# Patient Record
Sex: FEMALE | Race: WHITE | NOT HISPANIC OR LATINO | URBAN - NONMETROPOLITAN AREA
[De-identification: names, ages, dates, MRNs, and addresses within clinical notes are randomized per-mention and may not be internally consistent; named-entity substitution may affect disease eponyms.]

---

## 2019-08-12 ENCOUNTER — IMPORTED ENCOUNTER (OUTPATIENT)
Dept: URBAN - NONMETROPOLITAN AREA CLINIC 1 | Facility: CLINIC | Age: 84
End: 2019-08-12

## 2019-08-12 PROBLEM — H35.3131: Noted: 2019-08-12

## 2019-08-12 PROBLEM — H33.321: Noted: 2019-08-12

## 2019-08-12 PROBLEM — H25.011: Noted: 2019-08-12

## 2019-08-12 PROBLEM — D31.31: Noted: 2019-08-12

## 2019-08-12 PROBLEM — Z96.1: Noted: 2019-08-12

## 2019-08-12 PROCEDURE — 99214 OFFICE O/P EST MOD 30 MIN: CPT

## 2019-08-12 NOTE — PATIENT DISCUSSION
Non-Exudative ARMD OU- Recommend nutritional supplementation with high dose multivitamins and zinc (according to the AREDS II protocol) with continued yearly follow-up evaluation. Since the patient is at an increased risk for the development of Choroidal Neovascularization I gave the patient an [address] instructing them how to use asking the patient to return immediately should the patient notice any change in the [address] pattern or subjective change in visual acuity.- No clinical findings including sub retinal hemorrhages or exudates to suggest choroidal neovascularization.- No treatment at this time. Observation recommended. Cataract OD-Visually significant.-Cataract causing symptomatic impairment of visual function not correctable with a tolerable change in glasses or contact lenses lighting or non-operative means resulting in specific activity limitations and/or participation restrictions including but not limited to reading viewing television driving or meeting vocational or recreational needs. -Expectation is clearer vision and reduced glare disability after cataract removal.-Refer to Dr Carmen Mtz for cataract evaluations/p PCIOL OS-Stable PCIOL OS.-Monitor for PCO OS. Choroidal Nevus OD- 1 DD Choroidal Nevus no change from previous exam.Hx of retinal hole OD s/p laser tx OD (40 years ago)- No treatment indicated.

## 2019-09-11 ENCOUNTER — IMPORTED ENCOUNTER (OUTPATIENT)
Dept: URBAN - NONMETROPOLITAN AREA CLINIC 1 | Facility: CLINIC | Age: 84
End: 2019-09-11

## 2019-09-11 PROBLEM — H25.011: Noted: 2019-09-11

## 2019-09-11 PROBLEM — D31.31: Noted: 2019-09-11

## 2019-09-11 PROBLEM — Z96.1: Noted: 2019-09-11

## 2019-09-11 PROBLEM — H11.32: Noted: 2019-09-11

## 2019-09-11 PROBLEM — H35.3131: Noted: 2019-09-11

## 2019-09-11 PROBLEM — H33.321: Noted: 2019-09-11

## 2019-09-11 PROCEDURE — 99212 OFFICE O/P EST SF 10 MIN: CPT

## 2019-09-11 NOTE — PATIENT DISCUSSION
Subconjunctival Heme OS- Discussed with patient that [facility] will resolve on it's own and she will just have to give it a little time.- Recommend follow-up PRN*******************************Previous Exam****************************************Non-Exudative ARMD OU- Recommend nutritional supplementation with high dose multivitamins and zinc (according to the AREDS II protocol) with continued yearly follow-up evaluation. Since the patient is at an increased risk for the development of Choroidal Neovascularization I gave the patient an [address] instructing them how to use asking the patient to return immediately should the patient notice any change in the [address] pattern or subjective change in visual acuity.- No clinical findings including sub retinal hemorrhages or exudates to suggest choroidal neovascularization.- No treatment at this time. Observation recommended. Cataract OD-Not yet surgical. -Reviewed symptoms of advancing cataract growth such as glare and halos and decreased vision.-Continue to monitor for now. Pt will notify us if any new symptoms develop. s/p PCIOL OS-Stable PCIOL OS.-Monitor for PCO OS. Choroidal Nevus OD- 1 DD Choroidal Nevus no change from previous exam.Hx of retinal hole OD s/p laser tx OD (40 years ago)- No treatment indicated.

## 2020-09-09 ENCOUNTER — IMPORTED ENCOUNTER (OUTPATIENT)
Dept: URBAN - NONMETROPOLITAN AREA CLINIC 1 | Facility: CLINIC | Age: 85
End: 2020-09-09

## 2020-09-09 PROBLEM — Z96.1: Noted: 2020-09-09

## 2020-09-09 PROBLEM — H35.3131: Noted: 2020-09-09

## 2020-09-09 PROBLEM — H52.4: Noted: 2021-09-16

## 2020-09-09 PROBLEM — H26.492: Noted: 2021-09-16

## 2020-09-09 PROBLEM — D31.31: Noted: 2021-09-16

## 2020-09-09 PROBLEM — H25.011: Noted: 2020-09-09

## 2020-09-09 PROBLEM — H25.11: Noted: 2021-09-16

## 2020-09-09 PROBLEM — D31.31: Noted: 2020-09-09

## 2020-09-09 PROBLEM — H33.321: Noted: 2021-09-16

## 2020-09-09 PROBLEM — H33.321: Noted: 2020-09-09

## 2020-09-09 PROCEDURE — 92134 CPTRZ OPH DX IMG PST SGM RTA: CPT

## 2020-09-09 PROCEDURE — 99214 OFFICE O/P EST MOD 30 MIN: CPT

## 2020-09-09 NOTE — PATIENT DISCUSSION
Non-Exudative ARMD OU- Recommend nutritional supplementation with high dose multivitamins and zinc (according to the AREDS II protocol) with continued yearly follow-up evaluation. Since the patient is at an increased risk for the development of Choroidal Neovascularization I gave the patient an [id number] instructing them how to use asking the patient to return immediately should the patient notice any change in the [address] pattern or subjective change in visual acuity.- No clinical findings including sub retinal hemorrhages or exudates to suggest choroidal neovascularization.- No treatment at this time. Observation recommended. Cataract OD-Not yet surgical. -Reviewed symptoms of advancing cataract growth such as glare and halos and decreased vision.-Continue to monitor for now. Pt will notify us if any new symptoms develop. s/p PCIOL OS-Stable PCIOL OS.-Monitor for PCO OS. Choroidal Nevus OD- 1 DD Choroidal Nevus no change from previous exam.Hx of retinal hole OD s/p laser tx OD (40 years ago)- No treatment indicated. all

## 2021-09-16 ENCOUNTER — PREPPED CHART (OUTPATIENT)
Dept: RURAL CLINIC 3 | Facility: CLINIC | Age: 86
End: 2021-09-16

## 2021-09-16 ENCOUNTER — IMPORTED ENCOUNTER (OUTPATIENT)
Dept: URBAN - NONMETROPOLITAN AREA CLINIC 1 | Facility: CLINIC | Age: 86
End: 2021-09-16

## 2021-09-16 PROCEDURE — 99214 OFFICE O/P EST MOD 30 MIN: CPT

## 2021-09-16 PROCEDURE — 92250 FUNDUS PHOTOGRAPHY W/I&R: CPT

## 2021-09-16 PROCEDURE — 92015 DETERMINE REFRACTIVE STATE: CPT

## 2021-09-16 NOTE — PATIENT DISCUSSION
ARMD OUDiscussed the chronic nature of this disease and limited treatment options. Photos done today; baseline with drusen OU. Recommend taking eye vitamins daily and monitoring vision for changes with [employer] samples of Preservision given today. Continue to monitor. RTC in 6 months with OCT Denise ODDiscussed diagnosis with patient. Reviewed symptoms related to cataract progression. Discussed various treatment options with patient. Recommend cataract evaluation by Dr. Kindra Sanchez. Patient defers treatment at this time. Continue to monitor. P/C IOL OSDiscussed diagnosis in detail with patient. Intraocular implant in place and stable. Moderate PCO noted OS. No complaints from patient. Continue to monitor. Choroidal Nevus ODDiscussed diagnosis in detail with patient. 1 DD Choroidal Nevus no change from previous exam.Continue to monitor. Hx of retinal hole OD s/p laser tx OD (40 years ago)Discussed diagnosis in detail with patient. No treatment indicated. Continue to monitor. Presbyopia OUDiscussed refractive status in detail with patient. New glasses Rx given today. Continue to monitor.; 's Notes: MR  9/16/21DFE  9/16/21PHOTOS  9/16/21OCT

## 2022-03-15 ASSESSMENT — VISUAL ACUITY
OS_SC: 20/20-
OD_PH: 20/50
OD_SC: 20/400

## 2022-03-15 ASSESSMENT — TONOMETRY
OS_IOP_MMHG: 13
OD_IOP_MMHG: 14

## 2022-04-09 ASSESSMENT — VISUAL ACUITY
OD_CC: 20/400
OS_CC: 20/25-2
OD_PH: 20/50
OD_PH: 20/50-1
OD_PH: 20/50+2
OD_CC: 20/400
OD_PH: 20/50-2
OD_CC: 20/300
OD_CC: 20/400
OS_CC: 20/20-1
OS_CC: 20/20-2
OS_CC: 20/25-2

## 2022-04-09 ASSESSMENT — TONOMETRY
OS_IOP_MMHG: 15
OD_IOP_MMHG: 14
OD_IOP_MMHG: 15
OS_IOP_MMHG: 13
OS_IOP_MMHG: 14
OD_IOP_MMHG: 14

## 2023-01-05 ENCOUNTER — FOLLOW UP (OUTPATIENT)
Dept: RURAL CLINIC 3 | Facility: CLINIC | Age: 88
End: 2023-01-05

## 2023-01-05 DIAGNOSIS — H35.3131: ICD-10-CM

## 2023-01-05 DIAGNOSIS — H52.4: ICD-10-CM

## 2023-01-05 DIAGNOSIS — D31.31: ICD-10-CM

## 2023-01-05 DIAGNOSIS — H25.11: ICD-10-CM

## 2023-01-05 PROCEDURE — 92134 CPTRZ OPH DX IMG PST SGM RTA: CPT

## 2023-01-05 PROCEDURE — 92015 DETERMINE REFRACTIVE STATE: CPT

## 2023-01-05 PROCEDURE — 99214 OFFICE O/P EST MOD 30 MIN: CPT

## 2023-01-05 ASSESSMENT — VISUAL ACUITY
OS_SC: 20/20-2
OD_PH: 20/150
OD_SC: 20/200-1

## 2023-01-05 ASSESSMENT — TONOMETRY
OD_IOP_MMHG: 16
OS_IOP_MMHG: 16

## 2023-01-05 NOTE — PATIENT DISCUSSION
Discussed diagnosis in detail with patient. Reviewed symptoms related to cataract progression. Recommend refer to Dr. Brent Herrera for cataract evaluation. Patient elects to schedule.

## 2023-04-26 ENCOUNTER — PRE-OP/H&P (OUTPATIENT)
Dept: RURAL CLINIC 3 | Facility: CLINIC | Age: 88
End: 2023-04-26

## 2023-04-26 VITALS
HEIGHT: 62 IN | SYSTOLIC BLOOD PRESSURE: 134 MMHG | HEART RATE: 72 BPM | DIASTOLIC BLOOD PRESSURE: 76 MMHG | BODY MASS INDEX: 22.08 KG/M2 | WEIGHT: 120 LBS

## 2023-04-26 DIAGNOSIS — Z01.818: ICD-10-CM

## 2023-04-26 PROCEDURE — 99213 OFFICE O/P EST LOW 20 MIN: CPT

## 2023-04-26 ASSESSMENT — VISUAL ACUITY
OS_SC: 20/20
OD_CC: 20/50
OD_SC: 20/400
OD_PH: 20/70
OD_PAM: 20/30

## 2023-05-18 ENCOUNTER — SURGERY/PROCEDURE (OUTPATIENT)
Dept: RURAL CLINIC 3 | Facility: CLINIC | Age: 88
End: 2023-05-18

## 2023-05-18 ENCOUNTER — POST-OP (OUTPATIENT)
Dept: RURAL CLINIC 3 | Facility: CLINIC | Age: 88
End: 2023-05-18

## 2023-05-18 DIAGNOSIS — Z98.41: ICD-10-CM

## 2023-05-18 DIAGNOSIS — H25.11: ICD-10-CM

## 2023-05-18 PROCEDURE — 68841 INSJ RX ELUT IMPLT LAC CANAL: CPT

## 2023-05-18 PROCEDURE — 66984 XCAPSL CTRC RMVL W/O ECP: CPT

## 2023-05-18 PROCEDURE — 99024 POSTOP FOLLOW-UP VISIT: CPT

## 2023-05-18 ASSESSMENT — TONOMETRY
OS_IOP_MMHG: 14
OD_IOP_MMHG: 21

## 2023-05-18 ASSESSMENT — VISUAL ACUITY
OS_SC: 20/20
OD_SC: 20/70

## 2023-05-31 ENCOUNTER — POST-OP (OUTPATIENT)
Dept: RURAL CLINIC 3 | Facility: CLINIC | Age: 88
End: 2023-05-31

## 2023-05-31 DIAGNOSIS — Z98.41: ICD-10-CM

## 2023-05-31 PROCEDURE — 99024 POSTOP FOLLOW-UP VISIT: CPT

## 2023-05-31 ASSESSMENT — VISUAL ACUITY
OD_SC: 20/30-2
OS_SC: 20/25-2

## 2023-05-31 ASSESSMENT — TONOMETRY
OS_IOP_MMHG: 15
OD_IOP_MMHG: 15

## 2023-09-07 ENCOUNTER — FOLLOW UP (OUTPATIENT)
Dept: RURAL CLINIC 3 | Facility: CLINIC | Age: 88
End: 2023-09-07

## 2023-09-07 DIAGNOSIS — D31.31: ICD-10-CM

## 2023-09-07 DIAGNOSIS — H26.492: ICD-10-CM

## 2023-09-07 DIAGNOSIS — Z96.1: ICD-10-CM

## 2023-09-07 DIAGNOSIS — H35.3131: ICD-10-CM

## 2023-09-07 PROCEDURE — 92134 CPTRZ OPH DX IMG PST SGM RTA: CPT

## 2023-09-07 PROCEDURE — 99214 OFFICE O/P EST MOD 30 MIN: CPT

## 2023-09-07 ASSESSMENT — TONOMETRY
OD_IOP_MMHG: 12
OS_IOP_MMHG: 12

## 2023-09-07 ASSESSMENT — VISUAL ACUITY
OD_SC: 20/40
OS_SC: 20/30